# Patient Record
Sex: MALE | HISPANIC OR LATINO | Employment: OTHER | URBAN - METROPOLITAN AREA
[De-identification: names, ages, dates, MRNs, and addresses within clinical notes are randomized per-mention and may not be internally consistent; named-entity substitution may affect disease eponyms.]

---

## 2021-11-05 ENCOUNTER — TELEPHONE (OUTPATIENT)
Dept: CARDIOLOGY | Facility: MEDICAL CENTER | Age: 86
End: 2021-11-05

## 2021-11-05 NOTE — TELEPHONE ENCOUNTER
Spoke to patient regarding his upcoming appointment with [facility] on 11/10/2021. Patient informed me he will be going to [place] on Monday and will need to cancel his appointment with [facility]. He will be seeing his PCP in December and then will reach out if he wants to reschedule.

## 2023-09-16 ENCOUNTER — APPOINTMENT (OUTPATIENT)
Dept: RADIOLOGY | Facility: IMAGING CENTER | Age: 88
End: 2023-09-16
Attending: PHYSICIAN ASSISTANT
Payer: MEDICARE

## 2023-09-16 ENCOUNTER — OFFICE VISIT (OUTPATIENT)
Dept: URGENT CARE | Facility: CLINIC | Age: 88
End: 2023-09-16
Payer: MEDICARE

## 2023-09-16 VITALS
BODY MASS INDEX: 22.7 KG/M2 | OXYGEN SATURATION: 98 % | RESPIRATION RATE: 14 BRPM | DIASTOLIC BLOOD PRESSURE: 82 MMHG | HEIGHT: 68 IN | HEART RATE: 66 BPM | TEMPERATURE: 97.2 F | WEIGHT: 149.8 LBS | SYSTOLIC BLOOD PRESSURE: 122 MMHG

## 2023-09-16 DIAGNOSIS — R13.10 DYSPHAGIA, UNSPECIFIED TYPE: ICD-10-CM

## 2023-09-16 LAB — S PYO DNA SPEC NAA+PROBE: NOT DETECTED

## 2023-09-16 PROCEDURE — 87651 STREP A DNA AMP PROBE: CPT | Performed by: PHYSICIAN ASSISTANT

## 2023-09-16 PROCEDURE — 3079F DIAST BP 80-89 MM HG: CPT | Performed by: PHYSICIAN ASSISTANT

## 2023-09-16 PROCEDURE — 99203 OFFICE O/P NEW LOW 30 MIN: CPT | Performed by: PHYSICIAN ASSISTANT

## 2023-09-16 PROCEDURE — 70360 X-RAY EXAM OF NECK: CPT | Mod: TC | Performed by: PHYSICIAN ASSISTANT

## 2023-09-16 PROCEDURE — 3074F SYST BP LT 130 MM HG: CPT | Performed by: PHYSICIAN ASSISTANT

## 2023-09-16 RX ORDER — APIXABAN 5 MG/1
TABLET, FILM COATED ORAL
COMMUNITY
Start: 2023-07-14

## 2023-09-16 NOTE — PROGRESS NOTES
"Subjective     Frankie Livingston is a 89 y.o. male who presents with Pharyngitis (Hard to swallow x 1 week )            Patient presents with:  Pharyngitis: Hard to swallow x 1 week, patient is occasionally choking on foods that he would not normally.  He has been able to eat and drink normally today, he states he has just been taking very small bites, masticating excessively before swallowing which has seemed to do the trick.  Patient denies recent fever, chills, nausea vomiting.  Patient has not had any slurred speech, numbness or tingling to his face or weakness in his upper or lower extremities.  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  No other complaints.        Pharyngitis   This is a new problem. The current episode started in the past 7 days. The problem has been waxing and waning. Neither side of throat is experiencing more pain than the other. There has been no fever. The pain is mild. Associated symptoms include trouble swallowing. Pertinent negatives include no drooling, hoarse voice, swollen glands or vomiting. He has tried cool liquids and acetaminophen for the symptoms. The treatment provided no relief.       Review of Systems   HENT:  Positive for trouble swallowing. Negative for drooling and hoarse voice.    Gastrointestinal:  Negative for vomiting.   All other systems reviewed and are negative.             Objective     /82 (BP Location: Right arm, Patient Position: Sitting, BP Cuff Size: Adult)   Pulse 66   Temp 36.2 °C (97.2 °F) (Temporal)   Resp 14   Ht 1.727 m (5' 8\")   Wt 67.9 kg (149 lb 12.8 oz)   SpO2 98%   BMI 22.78 kg/m²      Physical Exam  Vitals and nursing note reviewed.   Constitutional:       General: He is not in acute distress.     Appearance: Normal appearance. He is well-developed. He is not ill-appearing or toxic-appearing.   HENT:      " Head: Normocephalic and atraumatic.      Right Ear: Tympanic membrane normal.      Left Ear: Tympanic membrane normal.      Nose: Nose normal.      Mouth/Throat:      Lips: Pink.      Mouth: Mucous membranes are moist.      Pharynx: Oropharynx is clear. Uvula midline.      Comments: Normal phonation, no lymphadenopathy, salivary glands are not tender or enlarged, thyroid is normal on palpation.  Eyes:      Extraocular Movements: Extraocular movements intact.      Conjunctiva/sclera: Conjunctivae normal.      Pupils: Pupils are equal, round, and reactive to light.   Cardiovascular:      Rate and Rhythm: Normal rate and regular rhythm.      Pulses: Normal pulses.      Heart sounds: Normal heart sounds.   Pulmonary:      Effort: Pulmonary effort is normal.      Breath sounds: Normal breath sounds.   Abdominal:      General: Bowel sounds are normal.      Palpations: Abdomen is soft.   Musculoskeletal:         General: Normal range of motion.      Cervical back: Normal range of motion and neck supple.   Skin:     General: Skin is warm and dry.      Capillary Refill: Capillary refill takes less than 2 seconds.   Neurological:      General: No focal deficit present.      Mental Status: He is alert and oriented to person, place, and time.      Cranial Nerves: No cranial nerve deficit.      Motor: Motor function is intact.      Coordination: Coordination is intact.      Gait: Gait normal.   Psychiatric:         Mood and Affect: Mood normal.                             Assessment & Plan              1. Dysphagia, unspecified type  DX-NECK FOR SOFT TISSUE    POCT Cepheid [employer] A Strep - PCR    Referral to ENT        Xray images viewed and interpreted by me, confirmed by radiology:      RADIOLOGY RESULTS   DX-NECK FOR SOFT TISSUE    Result Date: 9/16/2023 9/16/2023 3:40 PM HISTORY/REASON FOR EXAM:  Chronic dysphagia and choking TECHNIQUE/EXAM DESCRIPTION AND NUMBER OF VIEWS:  Soft tissues of the neck.    2 views. FINDINGS:  There is no evidence of foreign body in the soft tissues of the neck.  The subglottic airway has a normal appearance.  There is no evidence of ballooning of the piriform sinuses.  The epiglottic shadow appears normal.     NORMAL SOFT TISSUES OF THE NECK.           Patient HPI and physical exam are consistent with dysphagia, though etiology is unclear.    I have encouraged patient to continue drinking thickened fluids if he has any difficulty drinking fluids, as well as softer foods and smaller bites until he can be seen by ear nose and throat.    Referral to ear nose and throat has been placed in patient's chart.  Patient verbalized understanding and agreement with this treatment plan.    Differential diagnosis, supportive care, and indications for immediate follow-up discussed with patient.  Instructed to return to clinic or nearest emergency department for any change in condition, further concerns, or worsening of symptoms.    I personally reviewed prior external notes and test results pertinent to today's visit.  I have independently reviewed and interpreted all diagnostics ordered during this urgent care visit.    PT should follow up with PCP in 1-2 days for re-evaluation if symptoms have not improved.      Discussed red flags and reasons to return to UC or ED.      Pt and/or family verbalized understanding of diagnosis and follow up instructions and was offered informational handout on diagnosis.  PT discharged.     Please note that this dictation was created using voice recognition software. I have made every reasonable attempt to correct obvious errors, but I expect that there may be errors of grammar and possibly content that I did not discover before finalizing the note.

## 2023-09-18 ASSESSMENT — ENCOUNTER SYMPTOMS
VOMITING: 0
HOARSE VOICE: 0
TROUBLE SWALLOWING: 1
SWOLLEN GLANDS: 0

## 2023-11-29 ENCOUNTER — PATIENT MESSAGE (OUTPATIENT)
Dept: HEALTH INFORMATION MANAGEMENT | Facility: OTHER | Age: 88
End: 2023-11-29